# Patient Record
Sex: MALE | Race: WHITE | NOT HISPANIC OR LATINO | URBAN - METROPOLITAN AREA
[De-identification: names, ages, dates, MRNs, and addresses within clinical notes are randomized per-mention and may not be internally consistent; named-entity substitution may affect disease eponyms.]

---

## 2022-01-11 ENCOUNTER — APPOINTMENT (RX ONLY)
Dept: URBAN - METROPOLITAN AREA CLINIC 51 | Facility: CLINIC | Age: 72
Setting detail: DERMATOLOGY
End: 2022-01-11

## 2022-01-11 PROBLEM — D03.39 MELANOMA IN SITU OF OTHER PARTS OF FACE: Status: ACTIVE | Noted: 2022-01-11

## 2022-01-11 PROCEDURE — ? CONSULTATION FOR STAGED EXCISION WITH PERMANENT SECTIONS

## 2022-01-11 PROCEDURE — 99203 OFFICE O/P NEW LOW 30 MIN: CPT

## 2022-01-11 NOTE — PROCEDURE: CONSULTATION FOR STAGED EXCISION WITH PERMANENT SECTIONS
Other Plan: Pt will consult with Dr. Judson Nix due to previous surgeries done in same location. Once consult is complete pt will make a decision on how to proceed.
X Size Of Lesion In Cm (Optional): 0.9
Detail Level: Detailed
Size Of Lesion: 1

## 2022-01-11 NOTE — HPI: PROCEDURE (MOHS)
Body Location Override (Optional): Right superior central malar cheek (path states beneath mid right eye)

## 2022-06-13 ENCOUNTER — APPOINTMENT (RX ONLY)
Dept: URBAN - METROPOLITAN AREA CLINIC 71 | Facility: CLINIC | Age: 72
Setting detail: DERMATOLOGY
End: 2022-06-13

## 2022-06-13 PROBLEM — D03.39 MELANOMA IN SITU OF OTHER PARTS OF FACE: Status: ACTIVE | Noted: 2022-06-13

## 2022-06-13 PROCEDURE — ? STAGED EXCISION WITH PERMANENT SECTION

## 2022-06-13 PROCEDURE — 11643 EXC F/E/E/N/L MAL+MRG 2.1-3: CPT

## 2022-06-13 NOTE — PROCEDURE: STAGED EXCISION WITH PERMANENT SECTION
Detail Level: Detailed
Use Modifier 58: No
Stage: 1
Initial Size Of Lesion, X-Axis (Required For Stage 1): 1.3
Initial Size Of Lesion, Y-Axis (Required For Stage 1): 1.2
Margin In Cm (Required For Initial Stage): 0.5
Additional Stage Excised Diameter, X-Axis (Required Stage 2 And Above): 0
Lab: 13
Anesthesia Type: 1% lidocaine with epinephrine
Anesthesia Volume In Cc: 6
Hemostasis: Electrocautery
Estimated Blood Loss (Cc): minimal
Stage 1 Preamble: A sterile surgical marking pen was used to outline the lesion that the patient identified.  The aforementioned margin was then drawn around the outlined area. Anesthesia was infiltrated as noted above.  Sterile drapes were placed and the eyes were protected.
Additional Stages Preamble: A sterile surgical marking pen was used to outline the previously created surgical wound with the aforementioned margin around the corresponding positive peripheral margins determined by previous stage histology. Anesthesia was infiltrated as noted above.  Sterile drapes were placed and the eyes were protected.
Excision Text: Incisions were then made with a 15-blade scalpel along the previously drawn lines. Sharp dissection at the appropriate tissue plane was then performed beneath the tissue and the specimen was extirpated with care given to maintain the tissue orientation.
Path Notes (To The Dermatopathologist)- Will Also Render In The Note: The specimen was tagged at the 12 o'clock position with a nylon suture. Hash marks were made at the 3 and 6 o'clock positions.\\n\\nThe specimen was placed in formalin and sent for rush en face permanent sections for complete peripheral margin examination
Render Path Notes In Note?: Yes
Consent/Introductory Paragraph: The rationale for staged excision was explained to the patient and consent was obtained. The risks, benefits and alternatives to therapy were discussed in detail. Specifically, the risks of infection, scarring, bleeding, prolonged wound healing, incomplete removal, allergy to anesthesia, nerve injury and recurrence were addressed. Prior to the procedure, the treatment site was clearly identified and confirmed by the patient. All components of Universal Protocol/PAUSE Rule completed.\\n\\nThe patient understood that the standard of care for surgical excision involves excising 1.0 cm margins for thinly invasive melanoma and 5mm for melanoma in situ. The patient also understood that the \"Slow Mohs\" technique (en face peripheral margin examination) using immunohistochemical stains allows not only for LESS tissue to be removed, but for the excised tissue to be evaluated MORE comprehensively than it would be for a standard wide local excision.\\n\\nThe patient wished to move forward with slightly narrower margins using the Slow Mohs technique rather than wider margins using a less comprehensive margin examination.
Dressing Text: Vaseline and a pressure dressing were then applied to the wound.
Post-Care Instructions: I reviewed with the patient in detail post-care instructions. Patient is not to engage in any heavy lifting, exercise, or swimming for the next 14 days. Should the patient develop any fevers, chills, bleeding, severe pain patient will contact the office immediately.
Billing Type: Third-Party Bill

## 2022-09-26 ENCOUNTER — APPOINTMENT (RX ONLY)
Dept: URBAN - METROPOLITAN AREA CLINIC 50 | Facility: CLINIC | Age: 72
Setting detail: DERMATOLOGY
End: 2022-09-26

## 2022-09-26 PROBLEM — D03.39 MELANOMA IN SITU OF OTHER PARTS OF FACE: Status: ACTIVE | Noted: 2022-09-26

## 2022-09-26 PROCEDURE — ? STAGED EXCISION WITH PERMANENT SECTION

## 2022-09-26 PROCEDURE — 11646 EXC F/E/E/N/L MAL+MRG >4 CM: CPT | Mod: 58

## 2022-09-26 NOTE — PROCEDURE: STAGED EXCISION WITH PERMANENT SECTION
Path Notes (To The Dermatopathologist)- Will Also Render In The Note: The specimen was tagged at the 12 o'clock position with a nylon suture. Hash marks were made at the 3, 6, and 9 o'clock positions.\\n\\nThe specimen was placed in formalin and sent for rush en face permanent sections for complete peripheral margin examination

## 2022-09-26 NOTE — PROCEDURE: MIPS QUALITY
Quality 431: Preventive Care And Screening: Unhealthy Alcohol Use - Screening: Patient not identified as an unhealthy alcohol user when screened for unhealthy alcohol use using a systematic screening method
Detail Level: Detailed
Quality 226: Preventive Care And Screening: Tobacco Use: Screening And Cessation Intervention: Patient screened for tobacco use and is an ex/non-smoker
Quality 137: Melanoma: Continuity Of Care - Recall System: Documentation of system reason(s) for not entering patient's information into a recall system (eg, melanoma being monitored by another physician provider)

## 2022-09-27 ENCOUNTER — APPOINTMENT (RX ONLY)
Dept: URBAN - METROPOLITAN AREA CLINIC 51 | Facility: CLINIC | Age: 72
Setting detail: DERMATOLOGY
End: 2022-09-27

## 2022-09-27 PROBLEM — D03.39 MELANOMA IN SITU OF OTHER PARTS OF FACE: Status: ACTIVE | Noted: 2022-09-27

## 2022-09-27 PROCEDURE — ? STAGED EXCISION WITH PERMANENT SECTION

## 2022-09-27 PROCEDURE — 11646 EXC F/E/E/N/L MAL+MRG >4 CM: CPT | Mod: 58

## 2022-09-27 NOTE — PROCEDURE: STAGED EXCISION WITH PERMANENT SECTION
Detail Level: Detailed
Use Modifier 58: Yes
Stage: 3
Initial Size Of Lesion, X-Axis (Required For Stage 1): 0
Margin In Cm (Required For Initial Stage): 1
Additional Stage Excised Diameter, Y-Axis (Required Stage 2 And Above): 14
Total Surgical Defect X-Axis In Cm (Optional For Stage 2 And Above): 6.3
Total Surgical Defect Y-Axis In Cm (Optional For Stage 2 And Above): 5.6
Lab: 13
Anesthesia Type: 1% lidocaine with epinephrine
Anesthesia Volume In Cc: 6
Hemostasis: Electrocautery
Estimated Blood Loss (Cc): minimal
Stage 1 Preamble: A sterile surgical marking pen was used to outline the lesion that the patient identified.  The aforementioned margin was then drawn around the outlined area. Anesthesia was infiltrated as noted above.  Sterile drapes were placed and the eyes were protected.
Additional Stages Preamble: A sterile surgical marking pen was used to outline the previously created surgical wound with the aforementioned margin around the corresponding positive peripheral margins determined by previous stage histology. Anesthesia was infiltrated as noted above.  Sterile drapes were placed and the eyes were protected.
Excision Text: Incisions were then made with a 15-blade scalpel along the previously drawn lines. Sharp dissection at the appropriate tissue plane was then performed beneath the tissue and the specimen was extirpated with care given to maintain the tissue orientation.
Path Notes (To The Dermatopathologist)- Will Also Render In The Note: The specimen divided into four pieces \\nA) was tagged at the twelve o'clock position \\nB)was tagged at three o’clock position \\nC)was tagged at six o’clock position \\nD)was tagged at nine o’clock position with a nylon suture\\n.\\n\\nThe specimen was placed in formalin and sent for rush en face permanent sections for complete peripheral margin examination
Consent/Introductory Paragraph: The rationale for staged excision was explained to the patient and consent was obtained. The risks, benefits and alternatives to therapy were discussed in detail. Specifically, the risks of infection, scarring, bleeding, prolonged wound healing, incomplete removal, allergy to anesthesia, nerve injury and recurrence were addressed. Prior to the procedure, the treatment site was clearly identified and confirmed by the patient. All components of Universal Protocol/PAUSE Rule completed.\\n\\nThe patient understood that the standard of care for surgical excision involves excising 1.0 cm margins for thinly invasive melanoma and 5mm for melanoma in situ. The patient also understood that the \"Slow Mohs\" technique (en face peripheral margin examination) using immunohistochemical stains allows not only for LESS tissue to be removed, but for the excised tissue to be evaluated MORE comprehensively than it would be for a standard wide local excision.\\n\\nThe patient wished to move forward with slightly narrower margins using the Slow Mohs technique rather than wider margins using a less comprehensive margin examination.
Dressing Text: Vaseline and a pressure dressing were then applied to the wound.
Post-Care Instructions: I reviewed with the patient in detail post-care instructions. Patient is not to engage in any heavy lifting, exercise, or swimming for the next 14 days. Should the patient develop any fevers, chills, bleeding, severe pain patient will contact the office immediately.
Billing Type: Third-Party Bill
Bill For Surgical Tray: no

## 2022-10-03 ENCOUNTER — APPOINTMENT (RX ONLY)
Dept: URBAN - METROPOLITAN AREA CLINIC 71 | Facility: CLINIC | Age: 72
Setting detail: DERMATOLOGY
End: 2022-10-03

## 2022-10-03 PROBLEM — D03.39 MELANOMA IN SITU OF OTHER PARTS OF FACE: Status: ACTIVE | Noted: 2022-10-03

## 2022-10-03 PROCEDURE — 11646 EXC F/E/E/N/L MAL+MRG >4 CM: CPT | Mod: 58

## 2022-10-03 PROCEDURE — ? STAGED EXCISION WITH PERMANENT SECTION

## 2022-10-03 NOTE — PROCEDURE: STAGED EXCISION WITH PERMANENT SECTION
Path Notes (To The Dermatopathologist)- Will Also Render In The Note: The specimen was tagged long at the 3 o'clock position tagged short at the 6 o’clock with a nylon suture. Hash marks was made at the 4:30 position.\\n\\nThe specimen was placed in formalin and sent for rush en face permanent sections for complete peripheral margin examination

## 2022-10-04 ENCOUNTER — APPOINTMENT (RX ONLY)
Dept: URBAN - METROPOLITAN AREA CLINIC 51 | Facility: CLINIC | Age: 72
Setting detail: DERMATOLOGY
End: 2022-10-04

## 2022-10-04 PROBLEM — D03.39 MELANOMA IN SITU OF OTHER PARTS OF FACE: Status: ACTIVE | Noted: 2022-10-04

## 2022-10-04 PROCEDURE — 11646 EXC F/E/E/N/L MAL+MRG >4 CM: CPT | Mod: 58

## 2022-10-04 PROCEDURE — ? STAGED EXCISION WITH PERMANENT SECTION

## 2022-10-04 NOTE — PROCEDURE: STAGED EXCISION WITH PERMANENT SECTION
Path Notes (To The Dermatopathologist)- Will Also Render In The Note: The specimen was tagged at the2:30 position with a nylon suture. Taken from 2:30 - 4:30  positions.\\n\\nThe specimen was placed in formalin and sent for rush en face permanent sections for complete peripheral margin examination
